# Patient Record
Sex: FEMALE | Race: BLACK OR AFRICAN AMERICAN
[De-identification: names, ages, dates, MRNs, and addresses within clinical notes are randomized per-mention and may not be internally consistent; named-entity substitution may affect disease eponyms.]

---

## 2017-03-02 ENCOUNTER — HOSPITAL ENCOUNTER (EMERGENCY)
Age: 27
Discharge: HOME | End: 2017-03-02
Payer: MEDICAID

## 2017-03-05 ENCOUNTER — HOSPITAL ENCOUNTER (EMERGENCY)
Age: 27
Discharge: HOME | End: 2017-03-05
Payer: MEDICAID

## 2017-03-05 DIAGNOSIS — K02.9: Primary | ICD-10-CM

## 2017-03-05 DIAGNOSIS — R03.0: ICD-10-CM

## 2017-03-05 PROCEDURE — 99283 EMERGENCY DEPT VISIT LOW MDM: CPT

## 2017-08-16 ENCOUNTER — HOSPITAL ENCOUNTER (EMERGENCY)
Dept: HOSPITAL 76 - ED | Age: 27
Discharge: HOME | End: 2017-08-16
Payer: MEDICAID

## 2017-08-16 VITALS — DIASTOLIC BLOOD PRESSURE: 60 MMHG | SYSTOLIC BLOOD PRESSURE: 100 MMHG

## 2017-08-16 DIAGNOSIS — R10.31: ICD-10-CM

## 2017-08-16 DIAGNOSIS — R11.2: Primary | ICD-10-CM

## 2017-08-16 DIAGNOSIS — R19.7: ICD-10-CM

## 2017-08-16 DIAGNOSIS — R50.9: ICD-10-CM

## 2017-08-16 LAB
ALBUMIN/GLOB SERPL: 0.9 {RATIO} (ref 1–2.2)
ANION GAP SERPL CALCULATED.4IONS-SCNC: 5 MMOL/L (ref 6–13)
BASOPHILS NFR BLD AUTO: 0.1 10^3/UL (ref 0–0.1)
BASOPHILS NFR BLD AUTO: 0.8 %
BILIRUB BLD-MCNC: 0.3 MG/DL (ref 0.2–1)
BUN SERPL-MCNC: 8 MG/DL (ref 6–20)
CALCIUM UR-MCNC: 9.1 MG/DL (ref 8.5–10.3)
CHLORIDE SERPL-SCNC: 104 MMOL/L (ref 101–111)
CO2 SERPL-SCNC: 28 MMOL/L (ref 21–32)
CREAT SERPLBLD-SCNC: 0.7 MG/DL (ref 0.4–1)
CUL URINE ADD CHARGE: (no result)
EOSINOPHIL # BLD AUTO: 0.3 10^3/UL (ref 0–0.7)
EOSINOPHIL NFR BLD AUTO: 2.7 %
ERYTHROCYTE [DISTWIDTH] IN BLOOD BY AUTOMATED COUNT: 16.5 % (ref 12–15)
GFRSERPLBLD MDRD-ARVRAT: 124 ML/MIN/{1.73_M2} (ref 89–?)
GLOBULIN SER-MCNC: 4.1 G/DL (ref 2.1–4.2)
GLUCOSE SERPL-MCNC: 85 MG/DL (ref 70–100)
HCG UR QL: NEGATIVE
HCT VFR BLD AUTO: 40.3 % (ref 37–47)
HGB UR QL STRIP: 13.3 G/DL (ref 12–16)
LIPASE SERPL-CCNC: 22 U/L (ref 22–51)
LYMPHOCYTES # SPEC AUTO: 5.4 10^3/UL (ref 1.5–3.5)
LYMPHOCYTES NFR BLD AUTO: 52.1 %
MCH RBC QN AUTO: 26.5 PG (ref 27–31)
MCHC RBC AUTO-ENTMCNC: 32.9 G/DL (ref 32–36)
MCV RBC AUTO: 80.4 FL (ref 81–99)
MONOCYTES # BLD AUTO: 0.9 10^3/UL (ref 0–1)
MONOCYTES NFR BLD AUTO: 8.5 %
NEUTROPHILS # BLD AUTO: 3.7 10^3/UL (ref 1.5–6.6)
NEUTROPHILS # SNV AUTO: 10.3 X10^3/UL (ref 4.8–10.8)
NEUTROPHILS NFR BLD AUTO: 35.9 %
NRBC # BLD AUTO: 0.1 /100WBC
PDW BLD AUTO: 8.7 FL (ref 7.9–10.8)
PH UR STRIP.AUTO: 7 PH (ref 5–7.5)
PLAT MORPH BLD: (no result)
PLATELET BLD QL SMEAR: (no result)
POTASSIUM SERPL-SCNC: 3.8 MMOL/L (ref 3.5–5)
PROT SPEC-MCNC: 7.8 G/DL (ref 6.7–8.2)
RBC MAR: 5.01 10^6/UL (ref 4.2–5.4)
SODIUM SERPLBLD-SCNC: 137 MMOL/L (ref 135–145)
SP GR UR STRIP.AUTO: 1.01 (ref 1–1.03)
SP GR UR STRIP.AUTO: 1.01 (ref 1–1.03)
UA CHARGE (STRIP ONLY): (no result)
UA W/ MICROSCOPIC CHARGE: YES
UR CULTURE IF IND: (no result)
UROBILINOGEN UR STRIP.AUTO-MCNC: NEGATIVE MG/DL
WBC # BLD: 10.3 X10^3/UL
WBC MORPH BLD: (no result)

## 2017-08-16 PROCEDURE — 80053 COMPREHEN METABOLIC PANEL: CPT

## 2017-08-16 PROCEDURE — 83690 ASSAY OF LIPASE: CPT

## 2017-08-16 PROCEDURE — 36415 COLL VENOUS BLD VENIPUNCTURE: CPT

## 2017-08-16 PROCEDURE — 99283 EMERGENCY DEPT VISIT LOW MDM: CPT

## 2017-08-16 PROCEDURE — 81001 URINALYSIS AUTO W/SCOPE: CPT

## 2017-08-16 PROCEDURE — 81003 URINALYSIS AUTO W/O SCOPE: CPT

## 2017-08-16 PROCEDURE — 74177 CT ABD & PELVIS W/CONTRAST: CPT

## 2017-08-16 PROCEDURE — 81025 URINE PREGNANCY TEST: CPT

## 2017-08-16 PROCEDURE — 85025 COMPLETE CBC W/AUTO DIFF WBC: CPT

## 2017-08-16 PROCEDURE — 87086 URINE CULTURE/COLONY COUNT: CPT

## 2017-08-16 NOTE — CT PRELIMINARY REPORT
Accession: [id number]

Exam: CT Abdomen/Pelvis W/

 

IMPRESSION: 

1. No acute abnormality seen in the abdomen or pelvis. Normal appendix.

 

[facility]

 

SITE ID: [id number]
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
## 2019-12-06 ENCOUNTER — HOSPITAL ENCOUNTER (EMERGENCY)
Dept: HOSPITAL 76 - ED | Age: 29
LOS: 1 days | Discharge: HOME | End: 2019-12-07
Payer: MEDICAID

## 2019-12-06 DIAGNOSIS — S20.219A: ICD-10-CM

## 2019-12-06 DIAGNOSIS — S30.1XXA: Primary | ICD-10-CM
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
## 2019-12-06 LAB
ALBUMIN DIAFP-MCNC: 4.2 G/DL (ref 3.2–5.5)
ALBUMIN/GLOB SERPL: 1.1 {RATIO} (ref 1–2.2)
ALP SERPL-CCNC: 64 IU/L (ref 42–121)
ALT SERPL W P-5'-P-CCNC: 18 IU/L (ref 10–60)
ANION GAP SERPL CALCULATED.4IONS-SCNC: 9 MMOL/L (ref 6–13)
AST SERPL W P-5'-P-CCNC: 24 IU/L (ref 10–42)
BASOPHILS NFR BLD AUTO: 0.1 10^3/UL (ref 0–0.1)
BASOPHILS NFR BLD AUTO: 0.6 %
BILIRUB BLD-MCNC: 0.3 MG/DL (ref 0.2–1)
BUN SERPL-MCNC: 9 MG/DL (ref 6–20)
CALCIUM UR-MCNC: 9.1 MG/DL (ref 8.5–10.3)
CHLORIDE SERPL-SCNC: 103 MMOL/L (ref 101–111)
CLARITY UR REFRACT.AUTO: (no result)
CO2 SERPL-SCNC: 27 MMOL/L (ref 21–32)
CREAT SERPLBLD-SCNC: 0.8 MG/DL (ref 0.4–1)
EOSINOPHIL # BLD AUTO: 0.2 10^3/UL (ref 0–0.7)
EOSINOPHIL NFR BLD AUTO: 1.7 %
ERYTHROCYTE [DISTWIDTH] IN BLOOD BY AUTOMATED COUNT: 14.2 % (ref 12–15)
GFRSERPLBLD MDRD-ARVRAT: 104 ML/MIN/{1.73_M2} (ref 89–?)
GLOBULIN SER-MCNC: 3.8 G/DL (ref 2.1–4.2)
GLUCOSE SERPL-MCNC: 109 MG/DL (ref 70–100)
GLUCOSE UR QL STRIP.AUTO: NEGATIVE MG/DL
HCG UR QL: NEGATIVE
HGB UR QL STRIP: 13.2 G/DL (ref 12–16)
KETONES UR QL STRIP.AUTO: NEGATIVE MG/DL
LIPASE SERPL-CCNC: 31 U/L (ref 22–51)
LYMPHOCYTES # SPEC AUTO: 4.9 10^3/UL (ref 1.5–3.5)
LYMPHOCYTES NFR BLD AUTO: 43.9 %
MCH RBC QN AUTO: 27.3 PG (ref 27–31)
MCHC RBC AUTO-ENTMCNC: 32.4 G/DL (ref 32–36)
MCV RBC AUTO: 84.3 FL (ref 81–99)
MONOCYTES # BLD AUTO: 0.7 10^3/UL (ref 0–1)
MONOCYTES NFR BLD AUTO: 6.3 %
NEUTROPHILS # BLD AUTO: 5.3 10^3/UL (ref 1.5–6.6)
NEUTROPHILS # SNV AUTO: 11.2 X10^3/UL (ref 4.8–10.8)
NEUTROPHILS NFR BLD AUTO: 47.1 %
NITRITE UR QL STRIP.AUTO: NEGATIVE
PDW BLD AUTO: 10.2 FL (ref 7.9–10.8)
PH UR STRIP.AUTO: 7.5 PH (ref 5–7.5)
PLATELET # BLD: 343 10^3/UL (ref 130–450)
PROT SPEC-MCNC: 8 G/DL (ref 6.7–8.2)
PROT UR STRIP.AUTO-MCNC: NEGATIVE MG/DL
RBC # UR STRIP.AUTO: NEGATIVE /UL
RBC MAR: 4.83 10^6/UL (ref 4.2–5.4)
SODIUM SERPLBLD-SCNC: 139 MMOL/L (ref 135–145)
SP GR UR STRIP.AUTO: 1.01 (ref 1–1.03)
SP GR UR STRIP.AUTO: 1.01 (ref 1–1.03)
SQUAMOUS URNS QL MICRO: (no result)
UROBILINOGEN UR QL STRIP.AUTO: (no result) E.U./DL
UROBILINOGEN UR STRIP.AUTO-MCNC: NEGATIVE MG/DL

## 2019-12-06 PROCEDURE — 74177 CT ABD & PELVIS W/CONTRAST: CPT

## 2019-12-06 PROCEDURE — 81003 URINALYSIS AUTO W/O SCOPE: CPT

## 2019-12-06 PROCEDURE — 87086 URINE CULTURE/COLONY COUNT: CPT

## 2019-12-06 PROCEDURE — 81001 URINALYSIS AUTO W/SCOPE: CPT

## 2019-12-06 PROCEDURE — 36415 COLL VENOUS BLD VENIPUNCTURE: CPT

## 2019-12-06 PROCEDURE — 81025 URINE PREGNANCY TEST: CPT

## 2019-12-06 PROCEDURE — 85025 COMPLETE CBC W/AUTO DIFF WBC: CPT

## 2019-12-06 PROCEDURE — 83690 ASSAY OF LIPASE: CPT

## 2019-12-06 PROCEDURE — 80053 COMPREHEN METABOLIC PANEL: CPT

## 2019-12-06 PROCEDURE — 99284 EMERGENCY DEPT VISIT MOD MDM: CPT

## 2019-12-06 PROCEDURE — 71046 X-RAY EXAM CHEST 2 VIEWS: CPT

## 2019-12-07 VITALS — DIASTOLIC BLOOD PRESSURE: 79 MMHG | SYSTOLIC BLOOD PRESSURE: 122 MMHG
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
## 2021-09-03 ENCOUNTER — HOSPITAL ENCOUNTER (EMERGENCY)
Dept: HOSPITAL 76 - ED | Age: 31
Discharge: HOME | End: 2021-09-03
Payer: MEDICAID

## 2021-09-03 VITALS — SYSTOLIC BLOOD PRESSURE: 141 MMHG | DIASTOLIC BLOOD PRESSURE: 88 MMHG

## 2021-09-03 DIAGNOSIS — R10.11: Primary | ICD-10-CM

## 2021-09-03 LAB
ALBUMIN DIAFP-MCNC: 4 G/DL (ref 3.2–5.5)
ALBUMIN/GLOB SERPL: 1.1 {RATIO} (ref 1–2.2)
ALP SERPL-CCNC: 61 IU/L (ref 42–121)
ALT SERPL W P-5'-P-CCNC: 30 IU/L (ref 10–60)
ANION GAP SERPL CALCULATED.4IONS-SCNC: 10 MMOL/L (ref 6–13)
AST SERPL W P-5'-P-CCNC: 39 IU/L (ref 10–42)
BASOPHILS NFR BLD AUTO: 0.1 10^3/UL (ref 0–0.1)
BASOPHILS NFR BLD AUTO: 0.5 %
BILIRUB BLD-MCNC: 0.6 MG/DL (ref 0.2–1)
BUN SERPL-MCNC: 8 MG/DL (ref 6–20)
CALCIUM UR-MCNC: 9.7 MG/DL (ref 8.5–10.3)
CHLORIDE SERPL-SCNC: 102 MMOL/L (ref 101–111)
CLARITY UR REFRACT.AUTO: CLEAR
CO2 SERPL-SCNC: 26 MMOL/L (ref 21–32)
CREAT SERPLBLD-SCNC: 0.6 MG/DL (ref 0.4–1)
EOSINOPHIL # BLD AUTO: 0.2 10^3/UL (ref 0–0.7)
EOSINOPHIL NFR BLD AUTO: 1.7 %
ERYTHROCYTE [DISTWIDTH] IN BLOOD BY AUTOMATED COUNT: 14.4 % (ref 12–15)
GFRSERPLBLD MDRD-ARVRAT: 143 ML/MIN/{1.73_M2} (ref 89–?)
GLOBULIN SER-MCNC: 3.6 G/DL (ref 2.1–4.2)
GLUCOSE SERPL-MCNC: 104 MG/DL (ref 70–100)
GLUCOSE UR QL STRIP.AUTO: NEGATIVE MG/DL
HCG UR QL: NEGATIVE
HCT VFR BLD AUTO: 40.1 % (ref 37–47)
HGB UR QL STRIP: 13 G/DL (ref 12–16)
KETONES UR QL STRIP.AUTO: NEGATIVE MG/DL
LIPASE SERPL-CCNC: 23 U/L (ref 22–51)
LYMPHOCYTES # SPEC AUTO: 4.7 10^3/UL (ref 1.5–3.5)
LYMPHOCYTES NFR BLD AUTO: 45.7 %
MCH RBC QN AUTO: 27.3 PG (ref 27–31)
MCHC RBC AUTO-ENTMCNC: 32.4 G/DL (ref 32–36)
MCV RBC AUTO: 84.2 FL (ref 81–99)
MONOCYTES # BLD AUTO: 0.8 10^3/UL (ref 0–1)
MONOCYTES NFR BLD AUTO: 7.7 %
NEUTROPHILS # BLD AUTO: 4.5 10^3/UL (ref 1.5–6.6)
NEUTROPHILS # SNV AUTO: 10.2 X10^3/UL (ref 4.8–10.8)
NEUTROPHILS NFR BLD AUTO: 44.2 %
NITRITE UR QL STRIP.AUTO: NEGATIVE
NRBC # BLD AUTO: 0 /100WBC
NRBC # BLD AUTO: 0 X10^3/UL
PDW BLD AUTO: 10.4 FL (ref 7.9–10.8)
PH UR STRIP.AUTO: 5.5 PH (ref 5–7.5)
PLATELET # BLD: 296 10^3/UL (ref 130–450)
POTASSIUM SERPL-SCNC: 3.4 MMOL/L (ref 3.5–5)
PROT SPEC-MCNC: 7.6 G/DL (ref 6.7–8.2)
PROT UR STRIP.AUTO-MCNC: NEGATIVE MG/DL
RBC # UR STRIP.AUTO: (no result) /UL
RBC MAR: 4.76 10^6/UL (ref 4.2–5.4)
SODIUM SERPLBLD-SCNC: 138 MMOL/L (ref 135–145)
SP GR UR STRIP.AUTO: >=1.03 (ref 1–1.03)
UROBILINOGEN UR QL STRIP.AUTO: (no result) E.U./DL
UROBILINOGEN UR STRIP.AUTO-MCNC: NEGATIVE MG/DL

## 2021-09-03 PROCEDURE — 74177 CT ABD & PELVIS W/CONTRAST: CPT

## 2021-09-03 PROCEDURE — 96374 THER/PROPH/DIAG INJ IV PUSH: CPT

## 2021-09-03 PROCEDURE — 36415 COLL VENOUS BLD VENIPUNCTURE: CPT

## 2021-09-03 PROCEDURE — 80053 COMPREHEN METABOLIC PANEL: CPT

## 2021-09-03 PROCEDURE — 83690 ASSAY OF LIPASE: CPT

## 2021-09-03 PROCEDURE — 85025 COMPLETE CBC W/AUTO DIFF WBC: CPT

## 2021-09-03 PROCEDURE — 81001 URINALYSIS AUTO W/SCOPE: CPT

## 2021-09-03 PROCEDURE — 81025 URINE PREGNANCY TEST: CPT

## 2021-09-03 PROCEDURE — 87086 URINE CULTURE/COLONY COUNT: CPT

## 2021-09-03 PROCEDURE — 76705 ECHO EXAM OF ABDOMEN: CPT

## 2021-09-03 PROCEDURE — 81003 URINALYSIS AUTO W/O SCOPE: CPT

## 2021-09-03 PROCEDURE — 99284 EMERGENCY DEPT VISIT MOD MDM: CPT

## 2021-09-03 NOTE — ED PHYSICIAN DOCUMENTATION
PD HPI ABD PAIN





- Stated complaint


Stated Complaint: UPPER ABD PX/HEART BURN





- Chief complaint


Chief Complaint: Abd Pain





- History obtained from


History obtained from: Patient





- Additional information


Additional information: 





30-year-old woman without history of abdominal surgeries presents with 2 weeks 

of generally worsening intermittent epigastric and right upper quadrant pain 

worse after eating.  There is also a burning sensation in the chest.  She has 

tried antacids with at most minimal relief.  Seems to be worse with spicy foods 

but also with fried foods.





Review of Systems


Ten Systems: 10 systems reviewed and negative


Constitutional: reports: Reviewed and negative


Eyes: reports: Reviewed and negative


Ears: reports: Reviewed and negative


Nose: reports: Reviewed and negative





PD PAST MEDICAL HISTORY





- Past Medical History


GYN: Ovarian cysts





- Past Surgical History


Past Surgical History: No





- Present Medications


Home Medications: 


                                Ambulatory Orders











 Medication  Instructions  Recorded  Confirmed


 


Albuterol Sulf [Ventolin Hfa 1 - 2 puffs INH Q4HR PRN #1 inhaler 09/15/16 





Inhaler]   


 


Clindamycin [Cleocin] 300 mg PO Q6H 10 Days  capsule 03/02/17 


 


HYDROcod/ACETAM 5/325 [Norco 5/325] 1 - 2 ea PO Q6H PRN #15 tablet 03/02/17 


 


Meloxicam [Mobic] 7.5 mg PO BIDWM PRN #15 tablet 03/05/17 


 


Oxycodone HCl/Acetaminophen 1 - 2 tab PO Q4H PRN #15 tablet 03/05/17 





[Percocet 5-325 mg Tablet]   


 


Cyclobenzaprine [Flexeril] 10 mg PO TID PRN #20 tablet 12/07/19 


 


Hydrocodone/Acetaminophen 1 - 2 each PO Q6H PRN #14 tablet 12/07/19 





[Hydrocodon-Acetaminophen 5-325]   


 


HYDROcod/ACETAM 5/325 [Norco 5/325] 1 - 2 tab PO Q6H PRN #15 tablet 09/03/21 


 


Omeprazole 40 mg PO DAILY #30 cap 09/03/21 














- Allergies


Allergies/Adverse Reactions: 


                                    Allergies











Allergy/AdvReac Type Severity Reaction Status Date / Time


 


No Known Drug Allergies Allergy   Verified 09/03/21 16:24














- Social History


Does the pt smoke?: No


Smoking Status: Never smoker


Does the pt drink ETOH?: No


Does the pt have substance abuse?: No





- Immunizations


Immunizations are current?: Yes





- POLST


Patient has POLST: No





PD ED PE NORMAL





- Vitals


Vital signs reviewed: Yes





- General


General: Alert and oriented X 3, No acute distress





- HEENT


HEENT: PERRL, EOMI





- Neck


Neck: Supple, no meningeal sign, No bony TTP





- Cardiac


Cardiac: RRR, No murmur





- Respiratory


Respiratory: No respiratory distress, Clear bilaterally





- Abdomen


Abdomen: Other (Focal tenderness in the epigastrium and right upper quadrant 

with positive Magallon sign soft, bowel tones limited by BMI.)





Results





- Vitals


Vitals: 


                               Vital Signs - 24 hr











  09/03/21 09/03/21 09/03/21





  16:19 16:24 19:02


 


Temperature 36.3 C L 36.5 C 36.7 C


 


Heart Rate 74 74 63


 


Respiratory 15 15 18





Rate   


 


Blood Pressure 140/83 H 140/83 H 141/88 H


 


O2 Saturation 99 99 99








                                     Oxygen











O2 Source                      Room air

















- Labs


Labs: 


                                Laboratory Tests











  09/03/21 09/03/21 09/03/21





  16:58 17:24 17:24


 


WBC   10.2 


 


RBC   4.76 


 


Hgb   13.0 


 


Hct   40.1 


 


MCV   84.2 


 


MCH   27.3 


 


MCHC   32.4 


 


RDW   14.4 


 


Plt Count   296 


 


MPV   10.4 


 


Neut # (Auto)   4.5 


 


Lymph # (Auto)   4.7 H 


 


Mono # (Auto)   0.8 


 


Eos # (Auto)   0.2 


 


Baso # (Auto)   0.1 


 


Absolute Nucleated RBC   0.00 


 


Nucleated RBC %   0.0 


 


Sodium    138


 


Potassium    3.4 L


 


Chloride    102


 


Carbon Dioxide    26


 


Anion Gap    10.0


 


BUN    8


 


Creatinine    0.6


 


Estimated GFR (MDRD)    143


 


Glucose    104 H


 


Calcium    9.7


 


Total Bilirubin    0.6


 


AST    39


 


ALT    30


 


Alkaline Phosphatase    61


 


Total Protein    7.6


 


Albumin    4.0


 


Globulin    3.6


 


Albumin/Globulin Ratio    1.1


 


Lipase    23


 


Urine Color  YELLOW  


 


Urine Clarity  CLEAR  


 


Urine pH  5.5  


 


Ur Specific Gravity  >=1.030 H  


 


Urine Protein  NEGATIVE  


 


Urine Glucose (UA)  NEGATIVE  


 


Urine Ketones  NEGATIVE  


 


Urine Occult Blood  TRACE-INTA  


 


Urine Nitrite  NEGATIVE  


 


Urine Bilirubin  NEGATIVE  


 


Urine Urobilinogen  0.2 (NORMAL)  


 


Ur Leukocyte Esterase  NEGATIVE  


 


Ur Microscopic Review  NOT INDICATED  


 


Urine Culture Comments  NOT INDICATED  


 


Urine HCG, Qual  NEGATIVE  














PD MEDICAL DECISION MAKING





- ED course


ED course: 





30-year-old woman presents with a day of right upper quadrant pain, labs 

unremarkable.  Ultrasound basically nondiagnostic but some concern for biliary 

air or emphysematous cholecystitis this was followed by CT which did not 

demonstrate either of those entities.  She is feeling better after pain 

medication here.  Discussed unclear diagnosis and unfortunately most of the 

diagnostic possibilities specifically ulcer and biliary colic that were on the 

differential before testing but frankly are still on the differential, that said

there is no sign of cholecystitis so testing can be done as an outpatient.





Departure





- Departure


Disposition: 01 Home, Self Care


Clinical Impression: 


Abdominal pain


Qualifiers:


 Abdominal location: right upper quadrant Qualified Code(s): R10.11 - Right 

upper quadrant pain





Condition: Good


Record reviewed to determine appropriate education?: Yes


Instructions:  ED Abdominal Pain Unkn Cause


Follow-Up: 


Kam Daniels,  [Provider Admit Priv/Credential] - 


Prescriptions: 


HYDROcod/ACETAM 5/325 [Norco 5/325] 1 - 2 tab PO Q6H PRN #15 tablet


 PRN Reason: Pain


Omeprazole 40 mg PO DAILY #30 cap


Comments: 


[facility] [place].





Return for new or worsening symptoms, otherwise as discussed, your pain seemed c

onsistent with gallbladder pain, your ultrasound did not show an obvious problem

but was concerning this was followed by CT scan showing only a fatty liver.  

This does not rule out gallbladder problems, but ulcer is also a possibility.  

As such I am starting you on omeprazole to help with stomach acid and you should

follow-up with Dr. Daniels next week for reevaluation noting that he is our 

physician on-call for ER follow-ups next week.  If pain persists, in no 

particular order I recommend that she be referred for a HIDA scan, and/or upper 

endoscopy.





I am prescribing a short course of narcotic pain medication for you. These are 

potentially dangerous and addictive medications that should be used carefully.


These medications may constipate you. Take an over-the-counter stool softener 

(docusate) twice daily with plenty of water while taking these medications. If 

you go 24 hours without a bowel movement, take over-the-counter miralax, per 

package instructions.


Do not drink or drive while taking these medications.


If you received narcotic or sedating medications while in the emergency 

department, do not drive for 24 hours.


Store this medication in a safe, secure place and out of reach of children.


It is a violation of federal law to give or sell this medication to another 

person or to use in a manner other than prescribed.


The ED will not refill narcotic prescriptions, including prescriptions lost or 

stolen.


To dispose of unwanted medications:


1. [facility] at [address] in 

[place] has a medication drop box. They accept prescription medications (in 

pill form) Monday through Friday 9:00 a.m. to 5:00 p.m.


2. The [facility] [employer] accepts prescription medications (in

pill form only) for disposal year round. Call [phone] for more 

information.


3. Contact the [facility] for the next [facility] sponsored prescription 

drug collection event. [phone], [phone], or [phone] 

[phone];


Note that many narcotic pain relievers also contain Tylenol/acetaminophen.  

Please ensure that your total dose of acetaminophen from all sources does not 

exceed 3 g (3000 mg) per day.

## 2021-09-03 NOTE — CT REPORT
PROCEDURE:  Abdomen/Pelvis W

 

INDICATIONS:  IV only RUQ pain, abn sono

 

CONTRAST:  IV CONTRAST: Isovue 300 ml: 100 PO CONTRAST: *NO PO CONTRAST 

 

TECHNIQUE:  

After the administration of IV contrast, 5 mm thick sections acquired from the diaphragms to the symp
hysis.  5 mm thick coronal and sagittal reformats were acquired.  For radiation dose reduction, the f
ollowing was used:  automated exposure control, adjustment of mA and/or kV according to patient size.
  

 

COMPARISON:  12/7/2019. Correlation is also made with the accompanying abdominal ultrasound, 9/3/2021


 

FINDINGS:  

Image quality:  This study is limited by body habitus.   

 

ABDOMEN:  

Lung bases:  Lung bases are clear.  Heart size is normal.  

 

Solid organs: The liver demonstrates normal overall size. Diffuse fatty liver infiltration can be see
n.  The spleen is unremarkable.

 

In this patient with this given history, scrutiny is given to the gallbladder. The gallbladder is unr
emarkable, without gas or focal inflammatory change.

 

Biliary system is non dilated.  Pancreas enhances normally.  No adrenal nodules.  Kidneys demonstrate
 normal size and enhancement, without hydronephrosis.  

 

Peritoneum and bowel:  Bowel loops demonstrate normal wall thickness and caliber.  No free fluid or a
ir.  No appendix can be seen, either normal or abnormal. No focal right lower quadrant inflammatory c
hanges are seen. No focal right lower quadrant inflammatory changes are seen.

 

Nodes and vessels:  No retroperitoneal or mesenteric adenopathy by size criteria.  Aorta and inferior
 vena cava are normal in size.  

 

Miscellaneous:  No ventral hernias.  

 

 

PELVIS:  

Genitourinary:  Bladder wall thickness is normal.  The uterus demonstrates an unremarkable appearance
 for age. No adnexal masses are seen.

 

Miscellaneous:  No inguinal hernias or adenopathy.  

 

Bones:  No suspicious bony lesions.  No vertebral body compression fractures.  

 

 

 

IMPRESSION:  

 

Normal gallbladder by CT.

 

No appendix can be seen on this study, either normal or abnormal. No focal right lower quadrant infla
mmatory changes are seen.

 

 

Incidental note is made of:

Fatty liver infiltration

 

Reviewed by: Dayton Mulligan MD on 9/3/2021 6:01 PM SHIMA

Approved by: Dayton Mulligan MD on 9/3/2021 6:01 PM AKLEISA

 

 

Station ID:  [id number]

## 2021-09-03 NOTE — ULTRASOUND REPORT
PROCEDURE:  Abdomen Limited

 

INDICATIONS:  ruq pain

 

TECHNIQUE:  

Real-time scanning was performed of the abdominal and retroperitoneal organs, with image documentatio
n.  

 

COMPARISON:  None.

 

FINDINGS:  

 

Liver: Diffusely echogenic without focal mass lesion.

 

Gallbladder: There is dense echogenic shadowing arising from the gallbladder. No evidence of perichol
ecystic fluid. Gallbladder wall is not well visualized but measures approximately 3 mm.

 

Biliary ducts:  Intrahepatic bile ducts are non-dilated.  Extrahepatic bile duct caliber measures 2. 
mm.  Normal is 6-7 mm or less in diameter, or 10 mm or less post-cholecystectomy.  

 

Pancreas:  Visualized portions of the pancreas are sonographically normal.  

 

 

Kidneys:  Kidneys are normal in size and echotexture.  Right kidney measures 10.8 cm long;  long.  No
 hydronephrosis or nephrolithiasis.  No solid masses.  

Miscellaneous:  No free abdominal fluid.  

 

IMPRESSION:  

 

Densely shadowing gallbladder. The differential includes cholelithiasis and given the pattern of shad
owing, there possibly could be air within the wall of the gallbladder reflecting emphysematous cholec
ystitis. Consider CT and/or HIDA scan follow-up.

 

 

Reviewed by: Marciano Nina MD on 9/3/2021 5:56 PM SHIMA

Approved by: Marciano Nina MD on 9/3/2021 5:56 PM AKDT

 

 

Station ID:  [id number]